# Patient Record
Sex: MALE | Race: WHITE | NOT HISPANIC OR LATINO | Employment: FULL TIME | URBAN - METROPOLITAN AREA
[De-identification: names, ages, dates, MRNs, and addresses within clinical notes are randomized per-mention and may not be internally consistent; named-entity substitution may affect disease eponyms.]

---

## 2022-06-16 ENCOUNTER — OFFICE VISIT (OUTPATIENT)
Dept: FAMILY MEDICINE CLINIC | Facility: CLINIC | Age: 32
End: 2022-06-16

## 2022-06-16 VITALS
WEIGHT: 268.9 LBS | OXYGEN SATURATION: 99 % | HEART RATE: 88 BPM | TEMPERATURE: 98.4 F | RESPIRATION RATE: 20 BRPM | SYSTOLIC BLOOD PRESSURE: 130 MMHG | HEIGHT: 71 IN | DIASTOLIC BLOOD PRESSURE: 90 MMHG | BODY MASS INDEX: 37.65 KG/M2

## 2022-06-16 DIAGNOSIS — Z11.59 ENCOUNTER FOR HEPATITIS C SCREENING TEST FOR LOW RISK PATIENT: ICD-10-CM

## 2022-06-16 DIAGNOSIS — Z13.220 SCREENING, LIPID: ICD-10-CM

## 2022-06-16 DIAGNOSIS — F41.9 ANXIETY: Primary | ICD-10-CM

## 2022-06-16 DIAGNOSIS — Z23 COVID-19 VACCINE ADMINISTERED: ICD-10-CM

## 2022-06-16 LAB
25(OH)D3 SERPL-MCNC: 20.3 NG/ML (ref 30–100)
ALBUMIN SERPL-MCNC: 4.8 G/DL (ref 3.5–5.2)
ALBUMIN/GLOB SERPL: 1.5 G/DL
ALP SERPL-CCNC: 78 U/L (ref 39–117)
ALT SERPL W P-5'-P-CCNC: 18 U/L (ref 1–41)
ANION GAP SERPL CALCULATED.3IONS-SCNC: 11 MMOL/L (ref 5–15)
AST SERPL-CCNC: 18 U/L (ref 1–40)
BASOPHILS # BLD AUTO: 0.02 10*3/MM3 (ref 0–0.2)
BASOPHILS NFR BLD AUTO: 0.3 % (ref 0–1.5)
BILIRUB SERPL-MCNC: 0.4 MG/DL (ref 0–1.2)
BUN SERPL-MCNC: 19 MG/DL (ref 6–20)
BUN/CREAT SERPL: 21.1 (ref 7–25)
CALCIUM SPEC-SCNC: 9.2 MG/DL (ref 8.6–10.5)
CHLORIDE SERPL-SCNC: 105 MMOL/L (ref 98–107)
CHOLEST SERPL-MCNC: 166 MG/DL (ref 0–200)
CO2 SERPL-SCNC: 23 MMOL/L (ref 22–29)
CREAT SERPL-MCNC: 0.9 MG/DL (ref 0.76–1.27)
DEPRECATED RDW RBC AUTO: 37.6 FL (ref 37–54)
EGFRCR SERPLBLD CKD-EPI 2021: 117.1 ML/MIN/1.73
EOSINOPHIL # BLD AUTO: 0.08 10*3/MM3 (ref 0–0.4)
EOSINOPHIL NFR BLD AUTO: 1.3 % (ref 0.3–6.2)
ERYTHROCYTE [DISTWIDTH] IN BLOOD BY AUTOMATED COUNT: 12.2 % (ref 12.3–15.4)
FOLATE SERPL-MCNC: >20 NG/ML (ref 4.78–24.2)
GLOBULIN UR ELPH-MCNC: 3.1 GM/DL
GLUCOSE SERPL-MCNC: 112 MG/DL (ref 65–99)
HCT VFR BLD AUTO: 46.5 % (ref 37.5–51)
HCV AB SER DONR QL: NORMAL
HDLC SERPL-MCNC: 48 MG/DL (ref 40–60)
HGB BLD-MCNC: 15.9 G/DL (ref 13–17.7)
IMM GRANULOCYTES # BLD AUTO: 0.02 10*3/MM3 (ref 0–0.05)
IMM GRANULOCYTES NFR BLD AUTO: 0.3 % (ref 0–0.5)
LDLC SERPL CALC-MCNC: 102 MG/DL (ref 0–100)
LDLC/HDLC SERPL: 2.09 {RATIO}
LYMPHOCYTES # BLD AUTO: 1.58 10*3/MM3 (ref 0.7–3.1)
LYMPHOCYTES NFR BLD AUTO: 25 % (ref 19.6–45.3)
MCH RBC QN AUTO: 29.6 PG (ref 26.6–33)
MCHC RBC AUTO-ENTMCNC: 34.2 G/DL (ref 31.5–35.7)
MCV RBC AUTO: 86.6 FL (ref 79–97)
MONOCYTES # BLD AUTO: 0.42 10*3/MM3 (ref 0.1–0.9)
MONOCYTES NFR BLD AUTO: 6.6 % (ref 5–12)
NEUTROPHILS NFR BLD AUTO: 4.2 10*3/MM3 (ref 1.7–7)
NEUTROPHILS NFR BLD AUTO: 66.5 % (ref 42.7–76)
NRBC BLD AUTO-RTO: 0 /100 WBC (ref 0–0.2)
PLATELET # BLD AUTO: 246 10*3/MM3 (ref 140–450)
PMV BLD AUTO: 9.2 FL (ref 6–12)
POTASSIUM SERPL-SCNC: 3.7 MMOL/L (ref 3.5–5.2)
PROT SERPL-MCNC: 7.9 G/DL (ref 6–8.5)
RBC # BLD AUTO: 5.37 10*6/MM3 (ref 4.14–5.8)
SODIUM SERPL-SCNC: 139 MMOL/L (ref 136–145)
T4 FREE SERPL-MCNC: 1.2 NG/DL (ref 0.93–1.7)
TRIGL SERPL-MCNC: 88 MG/DL (ref 0–150)
TSH SERPL DL<=0.05 MIU/L-ACNC: 3.35 UIU/ML (ref 0.27–4.2)
VIT B12 BLD-MCNC: 506 PG/ML (ref 211–946)
VLDLC SERPL-MCNC: 16 MG/DL (ref 5–40)
WBC NRBC COR # BLD: 6.32 10*3/MM3 (ref 3.4–10.8)

## 2022-06-16 PROCEDURE — 80053 COMPREHEN METABOLIC PANEL: CPT | Performed by: NURSE PRACTITIONER

## 2022-06-16 PROCEDURE — 86803 HEPATITIS C AB TEST: CPT | Performed by: NURSE PRACTITIONER

## 2022-06-16 PROCEDURE — 84443 ASSAY THYROID STIM HORMONE: CPT | Performed by: NURSE PRACTITIONER

## 2022-06-16 PROCEDURE — 99204 OFFICE O/P NEW MOD 45 MIN: CPT | Performed by: NURSE PRACTITIONER

## 2022-06-16 PROCEDURE — 82607 VITAMIN B-12: CPT | Performed by: NURSE PRACTITIONER

## 2022-06-16 PROCEDURE — 85025 COMPLETE CBC W/AUTO DIFF WBC: CPT | Performed by: NURSE PRACTITIONER

## 2022-06-16 PROCEDURE — 82306 VITAMIN D 25 HYDROXY: CPT | Performed by: NURSE PRACTITIONER

## 2022-06-16 PROCEDURE — 82746 ASSAY OF FOLIC ACID SERUM: CPT | Performed by: NURSE PRACTITIONER

## 2022-06-16 PROCEDURE — 84439 ASSAY OF FREE THYROXINE: CPT | Performed by: NURSE PRACTITIONER

## 2022-06-16 PROCEDURE — 91305 COVID-19 (PFIZER) 12+ YRS: CPT | Performed by: NURSE PRACTITIONER

## 2022-06-16 PROCEDURE — 0051A COVID-19 (PFIZER) 12+ YRS: CPT | Performed by: NURSE PRACTITIONER

## 2022-06-16 PROCEDURE — 80061 LIPID PANEL: CPT | Performed by: NURSE PRACTITIONER

## 2022-06-16 RX ORDER — BUSPIRONE HYDROCHLORIDE 5 MG/1
5 TABLET ORAL 3 TIMES DAILY PRN
Qty: 90 TABLET | Refills: 1 | Status: SHIPPED | OUTPATIENT
Start: 2022-06-16 | End: 2022-08-10

## 2022-06-16 NOTE — PROGRESS NOTES
"Chief Complaint  Anxiety    Subjective          Hunter Traore presents to [facility]  History of Present Illness  He is here to [facility].  He used to be able to handle the stress but with new task and different jobs added he is having increased stress.  No Hi/Si.  He gets sad at times but nothing like the world is doom.  He will watch TV at times--he loves \"I love Jazmine\".  He has been worried  He is trying to please period  He is wanting a note for 2 weeks to get his mind in a better place  He is don't want to leave his job but he needs a break.  He said \"I just don't want to be at work but I love my job\".    Depression: Not at risk   • PHQ-2 Score: 0           Allergies  Patient has no known allergies.    Social History     Tobacco Use   • Smoking status: Former Smoker     Packs/day: 0.25     Years: 3.00     Pack years: 0.75     Types: Cigarettes     Quit date: 2019     Years since quitting: 3.4   • Smokeless tobacco: Never Used   Vaping Use   • Vaping Use: Never used   Substance Use Topics   • Alcohol use: Yes     Comment: on occasion   • Drug use: Never       Family History   Problem Relation Age of Onset   • Depression Brother         bipolar   • Stroke Maternal Grandmother         Health Maintenance Due   Topic Date Due   • ANNUAL PHYSICAL  Never done   • TDAP/TD VACCINES (1 - Tdap) Never done        Immunization History   Administered Date(s) Administered   • Covid-19 (Pfizer) Gray Cap 06/16/2022       Review of Systems   Constitutional: Negative for fatigue.   Respiratory: Negative for cough and shortness of breath.    Cardiovascular: Negative for chest pain.   Gastrointestinal: Negative for diarrhea, nausea and vomiting.   Psychiatric/Behavioral: Negative for hallucinations and suicidal ideas. The patient is nervous/anxious.         Objective       Vitals:    06/16/22 0923   BP: 130/90   Pulse: 88   Resp: 20   Temp: 98.4 °F (36.9 °C)   SpO2: 99%   Weight: 122 kg (268 lb " "14.4 oz)   Height: 180.3 cm (71\")       Body mass index is 37.5 kg/m².         Physical Exam  Vitals reviewed.   Constitutional:       Appearance: Normal appearance. He is well-developed.   Cardiovascular:      Rate and Rhythm: Normal rate and regular rhythm.      Heart sounds: Normal heart sounds. No murmur heard.  Pulmonary:      Effort: Pulmonary effort is normal.      Breath sounds: Normal breath sounds.   Neurological:      Mental Status: He is alert and oriented to person, place, and time.      Cranial Nerves: No cranial nerve deficit.      Motor: No weakness.   Psychiatric:         Mood and Affect: Mood and affect normal.             Result Review :     The following data was reviewed by: DIOMEDES Garces on 06/16/2022:                     Assessment and Plan      Diagnoses and all orders for this visit:    1. Anxiety (Primary)  -     Comprehensive Metabolic Panel  -     CBC & Differential  -     TSH  -     T4, Free  -     Vitamin B12 & Folate  -     Vitamin D 25 Hydroxy  -     busPIRone (BUSPAR) 5 MG tablet; Take 1 tablet by mouth 3 (Three) Times a Day As Needed (anixety).  Dispense: 90 tablet; Refill: 1    2. COVID-19 vaccine administered  -     COVID-19 Vaccine (Pfizer) Gray Cap    3. Encounter for hepatitis C screening test for low risk patient  -     Hepatitis C antibody    4. Screening, lipid  -     Lipid Panel        I spent 42 minutes caring for Hunter on this date of service. This time includes time spent by me in the following activities:preparing for the visit, obtaining and/or reviewing a separately obtained history, performing a medically appropriate examination and/or evaluation , counseling and educating the patient/family/caregiver, ordering medications, tests, or procedures and documenting information in the medical record    Follow Up     Return in about 6 weeks (around 7/28/2022).  We will start buspar but we will do a work note for 3 weeks and then do eval--he will keep me " posted.  He will send me do FMLA.  We did discuss in depth about anxiety and depression.  We will start with the BuSpar and follow-up in 6 weeks.  He will let me know through the MyChart how he is doing in a week or 2.  Any suicidal thoughts hallucinations seek help immediately.  Patient was given instructions and counseling regarding his condition or for health maintenance advice. Please see specific information pulled into the AVS if appropriate.         Shanti Owens, APRN  06/16/2022

## 2022-06-24 ENCOUNTER — PATIENT ROUNDING (BHMG ONLY) (OUTPATIENT)
Dept: FAMILY MEDICINE CLINIC | Facility: CLINIC | Age: 32
End: 2022-06-24

## 2022-06-24 NOTE — PROGRESS NOTES
June 24, 2022    Hello, may I speak with Hunter Traore?    My name is Jacqueline      I am  with [facility]  [address] [place] [address] [phone].    Before we get started may I verify your date of birth? 1990    I am calling to officially welcome you to our practice and ask about your recent visit. Is this a good time to talk? yes    Tell me about your visit with us. What things went well?  It went really well. It went a lot better then I thought it would. The nurse before Shanti was really nice.        We're always looking for ways to make our patients' experiences even better. Do you have recommendations on ways we may improve?  no    Overall were you satisfied with your first visit to our practice? yes       I appreciate you taking the time to speak with me today. Is there anything else I can do for you? no      Thank you, and have a great day.

## 2022-06-29 ENCOUNTER — TELEPHONE (OUTPATIENT)
Dept: FAMILY MEDICINE CLINIC | Facility: CLINIC | Age: 32
End: 2022-06-29

## 2022-06-29 NOTE — TELEPHONE ENCOUNTER
Caller: KIARA- NATI     Relationship: CLINICIAN     Best call back number: [phone]      What is the best time to reach you: ANYTIME     Who are you requesting to speak with (clinical staff, provider,  specific staff member): CLINICAL         What was the call regarding: NATI FROM [employer] CALLING REQUESTING OBJECTIVE DETAIL, WITH THE SYMPTOMS  OF ANXIETY THAT IS AFFECTING THE ABILITY FOR THE PATIENT TO WORK.     INFORMATION MAY BE FAXED OVER TO [facility]   FAX - [phone]     Do you require a callback: YES

## 2022-06-30 NOTE — TELEPHONE ENCOUNTER
PATIENT CALLED IN STATING THAT [employer] HAS CALLED HIM STATING THERE WAS NOT ENOUGH SIGNIFICANT EVIDENCE ON PAPERWORK  THAT PATIENT IS UNABLE TO WORK DUE TO ANXIETY

## 2022-07-28 ENCOUNTER — OFFICE VISIT (OUTPATIENT)
Dept: FAMILY MEDICINE CLINIC | Facility: CLINIC | Age: 32
End: 2022-07-28

## 2022-07-28 VITALS
BODY MASS INDEX: 38.25 KG/M2 | OXYGEN SATURATION: 100 % | RESPIRATION RATE: 14 BRPM | HEART RATE: 68 BPM | TEMPERATURE: 98 F | SYSTOLIC BLOOD PRESSURE: 130 MMHG | DIASTOLIC BLOOD PRESSURE: 80 MMHG | WEIGHT: 273.2 LBS | HEIGHT: 71 IN

## 2022-07-28 DIAGNOSIS — F41.9 ANXIETY: Primary | ICD-10-CM

## 2022-07-28 PROCEDURE — 99213 OFFICE O/P EST LOW 20 MIN: CPT | Performed by: NURSE PRACTITIONER

## 2022-07-28 RX ORDER — SERTRALINE HYDROCHLORIDE 25 MG/1
25 TABLET, FILM COATED ORAL DAILY
Qty: 30 TABLET | Refills: 1 | Status: SHIPPED | OUTPATIENT
Start: 2022-07-28 | End: 2022-09-21

## 2022-07-28 NOTE — PROGRESS NOTES
"Chief Complaint  Anxiety (States Buspar follow up)    Subjective          Hunter Martinez presents to [facility]  History of Present Illness  He is still having the anxiety but is is better than it was.  He feels anxious at work but it is not as bad.  He has a \"doing what I can\" outlook.  He is doing better.  He is sleeping better with the buspar.  His friend did notice a difference.  He does feel better overall.      Allergies  Patient has no known allergies.    Social History     Tobacco Use   • Smoking status: Former Smoker     Packs/day: 0.25     Years: 3.00     Pack years: 0.75     Types: Cigarettes     Quit date: 2019     Years since quitting: 3.5   • Smokeless tobacco: Never Used   Vaping Use   • Vaping Use: Never used   Substance Use Topics   • Alcohol use: Yes     Comment: on occasion   • Drug use: Never       Family History   Problem Relation Age of Onset   • Depression Brother         bipolar   • Stroke Maternal Grandmother         Health Maintenance Due   Topic Date Due   • ANNUAL PHYSICAL  Never done   • TDAP/TD VACCINES (1 - Tdap) Never done        Immunization History   Administered Date(s) Administered   • COVID-19 (PFIZER) PURPLE CAP 08/29/2021, 09/19/2021   • Covid-19 (Pfizer) Gray Cap 06/16/2022       Review of Systems   Constitutional: Negative for fatigue.   Respiratory: Negative for cough and shortness of breath.    Cardiovascular: Negative for chest pain.   Gastrointestinal: Negative for diarrhea, nausea and vomiting.   Psychiatric/Behavioral: Negative for hallucinations, suicidal ideas and depressed mood. The patient is nervous/anxious.         Objective       Vitals:    07/28/22 0918   BP: 130/80   Pulse: 68   Resp: 14   Temp: 98 °F (36.7 °C)   SpO2: 100%   Weight: 124 kg (273 lb 3.2 oz)   Height: 180.3 cm (71\")       Body mass index is 38.1 kg/m².         Physical Exam  Vitals reviewed.   Constitutional:       Appearance: Normal appearance. He is " well-developed.   Cardiovascular:      Rate and Rhythm: Normal rate and regular rhythm.      Heart sounds: Normal heart sounds. No murmur heard.  Pulmonary:      Effort: Pulmonary effort is normal.      Breath sounds: Normal breath sounds.   Neurological:      Mental Status: He is alert and oriented to person, place, and time.      Cranial Nerves: No cranial nerve deficit.      Motor: No weakness.   Psychiatric:         Mood and Affect: Mood and affect normal.             Result Review :     The following data was reviewed by: DIOMEDES Garces on 07/28/2022:    Common labs    Common Labsle 6/16/22 6/16/22 6/16/22    1012 1012 1012   Glucose  112 (A)    BUN  19    Creatinine  0.90    Sodium  139    Potassium  3.7    Chloride  105    Calcium  9.2    Albumin  4.80    Total Bilirubin  0.4    Alkaline Phosphatase  78    AST (SGOT)  18    ALT (SGPT)  18    WBC 6.32     Hemoglobin 15.9     Hematocrit 46.5     Platelets 246     Total Cholesterol   166   Triglycerides   88   HDL Cholesterol   48   LDL Cholesterol    102 (A)   (A) Abnormal value                               Assessment and Plan      Diagnoses and all orders for this visit:    1. Anxiety (Primary)  -     sertraline (Zoloft) 25 MG tablet; Take 1 tablet by mouth Daily.  Dispense: 30 tablet; Refill: 1            Follow Up     Return in about 3 months (around 10/28/2022).  1 month let me know how your doing with the cont of buspar and adding the zoloft.  If in 1 month we need to increase to 50mg we will and go from there.  We will f.u in 3 months in the office and go from there.  The key is to keep me posted and updated on any change.  Patient was given instructions and counseling regarding his condition or for health maintenance advice. Please see specific information pulled into the AVS if appropriate.         DIOMEDES Garces  07/28/2022

## 2022-08-10 DIAGNOSIS — F41.9 ANXIETY: ICD-10-CM

## 2022-08-10 RX ORDER — BUSPIRONE HYDROCHLORIDE 5 MG/1
5 TABLET ORAL 3 TIMES DAILY PRN
Qty: 90 TABLET | Refills: 0 | Status: SHIPPED | OUTPATIENT
Start: 2022-08-10 | End: 2022-10-31 | Stop reason: SDUPTHER

## 2022-09-07 DIAGNOSIS — F41.9 ANXIETY: ICD-10-CM

## 2022-09-07 RX ORDER — BUSPIRONE HYDROCHLORIDE 5 MG/1
5 TABLET ORAL 3 TIMES DAILY PRN
Qty: 90 TABLET | Refills: 0 | OUTPATIENT
Start: 2022-09-07

## 2022-09-21 DIAGNOSIS — F41.9 ANXIETY: ICD-10-CM

## 2022-09-21 RX ORDER — SERTRALINE HYDROCHLORIDE 25 MG/1
TABLET, FILM COATED ORAL
Qty: 30 TABLET | Refills: 0 | Status: SHIPPED | OUTPATIENT
Start: 2022-09-21 | End: 2022-10-31 | Stop reason: SDUPTHER

## 2022-10-25 DIAGNOSIS — F41.9 ANXIETY: ICD-10-CM

## 2022-10-25 RX ORDER — SERTRALINE HYDROCHLORIDE 25 MG/1
TABLET, FILM COATED ORAL
Qty: 30 TABLET | Refills: 0 | OUTPATIENT
Start: 2022-10-25

## 2022-10-31 ENCOUNTER — OFFICE VISIT (OUTPATIENT)
Dept: FAMILY MEDICINE CLINIC | Facility: CLINIC | Age: 32
End: 2022-10-31

## 2022-10-31 VITALS
OXYGEN SATURATION: 99 % | HEIGHT: 71 IN | HEART RATE: 76 BPM | DIASTOLIC BLOOD PRESSURE: 92 MMHG | RESPIRATION RATE: 20 BRPM | WEIGHT: 277.7 LBS | BODY MASS INDEX: 38.88 KG/M2 | SYSTOLIC BLOOD PRESSURE: 138 MMHG | TEMPERATURE: 97.8 F

## 2022-10-31 DIAGNOSIS — F41.9 ANXIETY: Primary | ICD-10-CM

## 2022-10-31 DIAGNOSIS — R03.0 ELEVATED BLOOD PRESSURE READING: ICD-10-CM

## 2022-10-31 PROCEDURE — 99213 OFFICE O/P EST LOW 20 MIN: CPT | Performed by: NURSE PRACTITIONER

## 2022-10-31 RX ORDER — BUSPIRONE HYDROCHLORIDE 5 MG/1
5 TABLET ORAL 3 TIMES DAILY PRN
Qty: 90 TABLET | Refills: 5 | Status: SHIPPED | OUTPATIENT
Start: 2022-10-31

## 2022-10-31 RX ORDER — SERTRALINE HYDROCHLORIDE 25 MG/1
25 TABLET, FILM COATED ORAL DAILY
Qty: 90 TABLET | Refills: 1 | Status: SHIPPED | OUTPATIENT
Start: 2022-10-31

## 2022-10-31 NOTE — PROGRESS NOTES
Answers for HPI/ROS submitted by the patient on 10/30/2022  What is the primary reason for your visit?: Shortness of Breath  Chronicity: recurrent  Onset: more than 1 month ago  Frequency: intermittently  Progression since onset: gradually improving  Episode duration: 1 Minutes  abdominal pain: No  chest pain: No  claudication: No  coryza: No  ear pain: No  fever: No  headaches: No  hemoptysis: No  leg pain: No  leg swelling: No  neck pain: No  orthopnea: No  PND: No  rash: No  rhinorrhea: No  sore throat: No  sputum production: No  swollen glands: No  syncope: No  vomiting: No  wheezing: No  Aggravating factors: emotional upset    Chief Complaint  Depression and Anxiety    Subjective          Hunter Martinez presents to [facility]  Shortness of Breath  This is a recurrent problem. The current episode started more than 1 month ago. The problem occurs intermittently. The problem has been gradually improving. The average episode lasts 1 Minutes. Pertinent negatives include no abdominal pain, chest pain, claudication, coryza, ear pain, fever, headaches, hemoptysis, leg pain, leg swelling, neck pain, orthopnea, PND, rash, rhinorrhea, sore throat, sputum production, swollen glands, syncope, vomiting or wheezing. The symptoms are aggravated by emotional upset.     He is doing well with current med.  He can tell it intensifies with caffeine.  He is not on edge.  He is at [employer] and less stress.  He started there last week.    He feels good.  NO SI/HI.  He has been increasing the salt and now he is watching diet.  He is not a morning person.       Allergies  Patient has no known allergies.    Social History     Tobacco Use   • Smoking status: Former     Packs/day: 0.25     Years: 3.00     Pack years: 0.75     Types: Cigarettes     Quit date: 1/1/2019     Years since quitting: 3.8   • Smokeless tobacco: Never   Vaping Use   • Vaping Use: Never used   Substance Use Topics   • Alcohol use:  "Yes     Comment: on occasion   • Drug use: Never       Family History   Problem Relation Age of Onset   • Depression Brother         bipolar   • Stroke Maternal Grandmother         Health Maintenance Due   Topic Date Due   • ANNUAL PHYSICAL  Never done   • TDAP/TD VACCINES (1 - Tdap) Never done   • INFLUENZA VACCINE  Never done   • COVID-19 Vaccine (4 - Booster for Pfizer series) 08/11/2022        Immunization History   Administered Date(s) Administered   • COVID-19 (PFIZER) PURPLE CAP 08/29/2021, 09/19/2021   • Covid-19 (Pfizer) Gray Cap 06/16/2022       Review of Systems   Constitutional: Negative for fever.   HENT: Negative for ear pain, rhinorrhea, sore throat and swollen glands.    Respiratory: Positive for shortness of breath. Negative for hemoptysis, sputum production and wheezing.    Cardiovascular: Negative for chest pain, orthopnea, claudication, leg swelling, syncope and PND.   Gastrointestinal: Negative for abdominal pain and vomiting.   Musculoskeletal: Negative for neck pain.   Skin: Negative for rash.    The SOA is more anxiety related.    Objective       Vitals:    10/31/22 0911 10/31/22 0923   BP: (!) 142/102 138/92   Pulse: 76    Resp: 20    Temp: 97.8 °F (36.6 °C)    SpO2: 99%    Weight: 126 kg (277 lb 11.2 oz)    Height: 180.3 cm (71\")        Body mass index is 38.73 kg/m².         Physical Exam  Vitals reviewed.   Constitutional:       Appearance: Normal appearance. He is well-developed.   Cardiovascular:      Rate and Rhythm: Normal rate and regular rhythm.      Heart sounds: Normal heart sounds. No murmur heard.  Pulmonary:      Effort: Pulmonary effort is normal.      Breath sounds: Normal breath sounds.   Neurological:      Mental Status: He is alert and oriented to person, place, and time.      Cranial Nerves: No cranial nerve deficit.      Motor: No weakness.   Psychiatric:         Mood and Affect: Mood and affect normal.             Result Review :     The following data was reviewed by: " Shanti Owens, APRN on 10/31/2022:    Common labs    Common Labs 6/16/22 6/16/22 6/16/22    1012 1012 1012   Glucose  112 (A)    BUN  19    Creatinine  0.90    Sodium  139    Potassium  3.7    Chloride  105    Calcium  9.2    Albumin  4.80    Total Bilirubin  0.4    Alkaline Phosphatase  78    AST (SGOT)  18    ALT (SGPT)  18    WBC 6.32     Hemoglobin 15.9     Hematocrit 46.5     Platelets 246     Total Cholesterol   166   Triglycerides   88   HDL Cholesterol   48   LDL Cholesterol    102 (A)   (A) Abnormal value                           Assessment and Plan      Diagnoses and all orders for this visit:    1. Anxiety (Primary)  -     busPIRone (BUSPAR) 5 MG tablet; Take 1 tablet by mouth 3 (Three) Times a Day As Needed (anixety).  Dispense: 90 tablet; Refill: 5  -     sertraline (ZOLOFT) 25 MG tablet; Take 1 tablet by mouth Daily.  Dispense: 90 tablet; Refill: 1    2. Elevated blood pressure reading            Follow Up     Return in about 6 months (around 4/30/2023).  Follow-up in 6 months for labs and appt. Call with any concerns or questions that you may have regarding your medications or history.    He will check BP at home AM and PM for 1-2 weeks and let me know via [employer] on how the BP is doing and watch the salt intake.   Patient was given instructions and counseling regarding his condition or for health maintenance advice. Please see specific information pulled into the AVS if appropriate.         Shanti Owens, DIOMEDES  10/31/2022

## 2023-02-12 DIAGNOSIS — F41.9 ANXIETY: ICD-10-CM

## 2023-02-13 RX ORDER — SERTRALINE HYDROCHLORIDE 25 MG/1
TABLET, FILM COATED ORAL
Qty: 90 TABLET | Refills: 1 | OUTPATIENT
Start: 2023-02-13